# Patient Record
Sex: FEMALE
[De-identification: names, ages, dates, MRNs, and addresses within clinical notes are randomized per-mention and may not be internally consistent; named-entity substitution may affect disease eponyms.]

---

## 2021-06-03 ENCOUNTER — NON-APPOINTMENT (OUTPATIENT)
Age: 21
End: 2021-06-03

## 2021-06-04 PROBLEM — Z00.00 ENCOUNTER FOR PREVENTIVE HEALTH EXAMINATION: Status: ACTIVE | Noted: 2021-06-04

## 2021-06-08 ENCOUNTER — APPOINTMENT (OUTPATIENT)
Dept: OTOLARYNGOLOGY | Facility: CLINIC | Age: 21
End: 2021-06-08
Payer: MEDICAID

## 2021-06-08 VITALS — TEMPERATURE: 98.3 F | BODY MASS INDEX: 29.88 KG/M2 | WEIGHT: 175 LBS | HEIGHT: 64 IN

## 2021-06-08 DIAGNOSIS — H93.293 OTHER ABNORMAL AUDITORY PERCEPTIONS, BILATERAL: ICD-10-CM

## 2021-06-08 DIAGNOSIS — Z78.9 OTHER SPECIFIED HEALTH STATUS: ICD-10-CM

## 2021-06-08 DIAGNOSIS — H65.07 ACUTE SEROUS OTITIS MEDIA, RECURRENT, UNSPECIFIED EAR: ICD-10-CM

## 2021-06-08 PROCEDURE — 92557 COMPREHENSIVE HEARING TEST: CPT

## 2021-06-08 PROCEDURE — 99072 ADDL SUPL MATRL&STAF TM PHE: CPT

## 2021-06-08 PROCEDURE — 92567 TYMPANOMETRY: CPT

## 2021-06-08 PROCEDURE — 99204 OFFICE O/P NEW MOD 45 MIN: CPT

## 2021-06-08 RX ORDER — DOXYCYCLINE HYCLATE 100 MG/1
100 TABLET ORAL
Refills: 0 | Status: ACTIVE | COMMUNITY

## 2021-06-08 NOTE — ASSESSMENT
[FreeTextEntry1] : the audiogram was ordered by me and interpreted by me today and the results are as follows- she has a moderate and symmetric conductive hearing loss on the left with a flat tympanogram.\par In the short-term am going to recommend oral burst of prednisone.\par She will discontinue the nasal spray.\par \par She will follow up with me in 2 weeks with one of her parents.\par If there is still fluid present she could consider an in office myringotomy and tube placement.\par We also talked about addressing her larger point of left sided eustachian tube dysfunction with an in office eustachian tube balloon plasty.

## 2021-06-08 NOTE — CONSULT LETTER
[Dear  ___] : Dear  [unfilled], [Consult Letter:] : I had the pleasure of evaluating your patient, [unfilled]. [Please see my note below.] : Please see my note below. [Sincerely,] : Sincerely, [FreeTextEntry1] : Enclosed- audogram. [FreeTextEntry3] : Sloan Smith MD\par [facility]- Co-Director\par [facility] Physician [facility]

## 2021-06-08 NOTE — HISTORY OF PRESENT ILLNESS
[de-identified] : Initial visit, referred in consultation.\par Her chief complaint is "fluid in the left ear".\par \par She begins a history by reporting to me that several years ago she had an ongoing left recurrent ear problems. After several evaluations and medication she had a tube placed in her left ear in the operating room.\par \par She reports that Dr. Ayers eventually removed the tube few years ago.\par \par At this point she is complaining of decreased hearing on the left.\par Dr. Ayers started on topical nasal spray recently.

## 2021-07-12 ENCOUNTER — APPOINTMENT (OUTPATIENT)
Dept: OTOLARYNGOLOGY | Facility: CLINIC | Age: 21
End: 2021-07-12
Payer: MEDICAID

## 2021-07-12 VITALS — TEMPERATURE: 97.3 F | WEIGHT: 175 LBS | HEIGHT: 64 IN | BODY MASS INDEX: 29.88 KG/M2

## 2021-07-12 PROCEDURE — 99213 OFFICE O/P EST LOW 20 MIN: CPT | Mod: 25

## 2021-07-12 PROCEDURE — 99072 ADDL SUPL MATRL&STAF TM PHE: CPT

## 2021-07-12 PROCEDURE — 69433 CREATE EARDRUM OPENING: CPT

## 2021-07-12 NOTE — CONSULT LETTER
[Dear  ___] : Dear  [unfilled], [Courtesy Letter:] : I had the pleasure of seeing your patient, [unfilled], in my office today. [Please see my note below.] : Please see my note below. [Sincerely,] : Sincerely, [FreeTextEntry3] : Sloan Smith MD\par [facility]- Co-Director\par [facility] Physician [facility]

## 2021-07-12 NOTE — ASSESSMENT
[FreeTextEntry1] : There was evidence of serous fluid.\par She tolerated the procedure well.\par \par I explained to the patient and her father that the underlying pathology as with her left eustachian tube.\par \par We talked about in office left-sided balloon eustachian tube plasty done under local anesthesia.\par They will consider this as an option for prevention of recurrent otitis media.\par \par I will see her in follow up in one month.\par Water precautions were discussed.

## 2021-07-12 NOTE — HISTORY OF PRESENT ILLNESS
[de-identified] : Initial visit, referred in consultation.\par Her chief complaint is "fluid in the left ear".\par \par She begins a history by reporting to me that several years ago she had an ongoing left recurrent ear problems. After several evaluations and medication she had a tube placed in her left ear in the operating room.\par \par She reports that Dr. Ayers eventually removed the tube few years ago.\par \par At this point she is complaining of decreased hearing on the left.\par Dr. Ayers started on topical nasal spray recently. [FreeTextEntry1] : 07/12/2021 \par \par She comes in with her father today.\par She reports that when she initially used oral steroids she felt that her hearing has improved some but upon completion of the oral steroid she has noticed that she is back to her baseline decreased hearing.\par \par She does report that she had a myringotomy tube placed when she was approximately 10 years old on the left.

## 2021-07-12 NOTE — PROCEDURE
[FreeTextEntry1] : left M&T [FreeTextEntry2] : THEODORE [FreeTextEntry3] : Procedure: /LEFT Myringotomy  {WITH Tube.\par Management options reviewed in detail.  All risks limitations complications and alternatives reviewed with the patient who agreed.\par Anesthesia topical phenol tetracaine\par T-tube\par Findings:  {Serous effusion no effusion mucoid clear\par Complications: None\par Estimated Blood Loss: None\par

## 2021-08-17 ENCOUNTER — APPOINTMENT (OUTPATIENT)
Dept: OTOLARYNGOLOGY | Facility: CLINIC | Age: 21
End: 2021-08-17

## 2021-08-30 ENCOUNTER — APPOINTMENT (OUTPATIENT)
Dept: OTOLARYNGOLOGY | Facility: CLINIC | Age: 21
End: 2021-08-30
Payer: MEDICAID

## 2021-08-30 VITALS — WEIGHT: 170 LBS | HEIGHT: 64 IN | BODY MASS INDEX: 29.02 KG/M2 | TEMPERATURE: 97.5 F

## 2021-08-30 DIAGNOSIS — H90.12 CONDUCTIVE HEARING LOSS, UNILATERAL, LEFT EAR, WITH UNRESTRICTED HEARING ON THE CONTRALATERAL SIDE: ICD-10-CM

## 2021-08-30 PROCEDURE — 69705Z: CUSTOM

## 2021-08-30 RX ORDER — PREDNISONE 10 MG/1
10 TABLET ORAL
Qty: 12 | Refills: 0 | Status: COMPLETED | COMMUNITY
Start: 2021-06-08 | End: 2021-08-30

## 2021-09-30 ENCOUNTER — APPOINTMENT (OUTPATIENT)
Dept: OTOLARYNGOLOGY | Facility: CLINIC | Age: 21
End: 2021-09-30
Payer: MEDICAID

## 2021-09-30 DIAGNOSIS — H69.82 OTHER SPECIFIED DISORDERS OF EUSTACHIAN TUBE, LEFT EAR: ICD-10-CM

## 2021-09-30 PROCEDURE — 99214 OFFICE O/P EST MOD 30 MIN: CPT

## 2021-09-30 NOTE — ASSESSMENT
[FreeTextEntry1] : Clinically doing very well.\par She will continue with water precautions.\par She was seen in follow up in 4 months.

## 2021-09-30 NOTE — CONSULT LETTER
[Dear  ___] : Dear  [unfilled], [Courtesy Letter:] : I had the pleasure of seeing your patient, [unfilled], in my office today. [Please see my note below.] : Please see my note below. [Sincerely,] : Sincerely, [FreeTextEntry3] : Sloan Smith MD\par [facility]- Co-Director\par [facility] Physician [facility]

## 2022-01-24 ENCOUNTER — APPOINTMENT (OUTPATIENT)
Dept: OTOLARYNGOLOGY | Facility: CLINIC | Age: 22
End: 2022-01-24

## 2022-05-17 ENCOUNTER — APPOINTMENT (OUTPATIENT)
Dept: OTOLARYNGOLOGY | Facility: CLINIC | Age: 22
End: 2022-05-17
